# Patient Record
Sex: FEMALE | URBAN - METROPOLITAN AREA
[De-identification: names, ages, dates, MRNs, and addresses within clinical notes are randomized per-mention and may not be internally consistent; named-entity substitution may affect disease eponyms.]

---

## 2022-01-03 ENCOUNTER — OFFICE VISIT (OUTPATIENT)
Dept: URBAN - METROPOLITAN AREA CLINIC 85 | Facility: CLINIC | Age: 52
End: 2022-01-03

## 2022-01-03 DIAGNOSIS — H25.13 AGE-RELATED NUCLEAR CATARACT, BILATERAL: Primary | ICD-10-CM

## 2022-01-03 DIAGNOSIS — H52.4 PRESBYOPIA: ICD-10-CM

## 2022-01-03 PROCEDURE — 92004 COMPRE OPH EXAM NEW PT 1/>: CPT | Performed by: OPHTHALMOLOGY

## 2022-01-03 ASSESSMENT — INTRAOCULAR PRESSURE
OD: 18
OS: 18

## 2022-01-03 ASSESSMENT — KERATOMETRY
OS: 40.88
OD: 41.13

## 2022-01-03 ASSESSMENT — VISUAL ACUITY
OS: 20/20
OD: 20/20

## 2022-01-03 NOTE — IMPRESSION/PLAN
Impression: Age-related nuclear cataract, bilateral: H25.13. Plan: No treatment currently recommended due to level of vision. Patient will monitor for vision changes and contact us with any decrease in vision. Will re-evaluate cataract on return visit, RTC 2 year or ASAP if decrease in [address], pain or worsening of any condition.  Recommend UV protection/sunglasses and update spectacle RX PRN